# Patient Record
(demographics unavailable — no encounter records)

---

## 2024-11-26 NOTE — HISTORY OF PRESENT ILLNESS
[Has friends] : has friends [Has interests/participates in community activities/volunteers] : has interests/participates in community activities/volunteers. [Is permitted and is able to make independent decisions] : Is permitted and is able to make independent decisions [Grade: ____] : Grade: [unfilled] [Calcium source] : calcium source [Has concerns about body or appearance] : does not have concerns about body or appearance [Uses electronic nicotine delivery system] : does not use electronic nicotine delivery system [Uses tobacco] : does not use tobacco [Uses drugs] : does not use drugs  [Drinks alcohol] : does not drink alcohol [Has peer relationships free of violence] : has peer relationships free of violence [No] : Patient has not had sexual intercourse. [Has ways to cope with stress] : has ways to cope with stress [Displays self-confidence] : displays self-confidence [Has problems with sleep] : does not have problems with sleep [Gets depressed, anxious, or irritable/has mood swings] : does not get depressed, anxious, or irritable/has mood swings [Has thought about hurting self or considered suicide] : has not thought about hurting self or considered suicide [de-identified] : now on gluten free diet as mom read it may help with ADHD, patient feels she's tolerating it well; no restricting, not trying to lose weight [de-identified] : plays soccer at school [FreeTextEntry1] : 18 year old female for annual PE.  Currently in 12th grade. Applying to colleges.   No changes in health in last year. No concerns today.  Follows with child psychiatrist in [place] for ADHD and anxiety. Family has moved to [place]. Patient continues in boarding school. Therapy every two weeks.   LMP: current. Cycles: monthly. Duration: 5-6 days. Periods aren't heavy. Mild dysmenorrhea--relieved with advil or rest.   No chest pain, SOB, dizziness with exertion. No fractures or other injuries. No surgeries. No hx of concussion. Never restricted from sports by MD. No family hx of heart disease or sudden death.  Due for meningitis booster as did not get before 12th grade started.

## 2024-11-26 NOTE — DISCUSSION/SUMMARY
[Physical Growth and Development] : physical growth and development [Social and Academic Competence] : social and academic competence [Emotional Well-Being] : emotional well-being [Risk Reduction] : risk reduction [Violence and Injury Prevention] : violence and injury prevention [FreeTextEntry1] : 18 year old female with ADHD and anxiety for annual PE  1. Meningitis Vaccine administered without issue. VIS provided. Consent obtained. Counseled on common/severe reactions, reasons to see MD. Would also like flu vaccine but does not like needles so will wait for another day for flu vaccine.. 2. AG as per above 3. Continue therapy and psychiatry. 4. HCM labs. 5. Growth curve reviewed with patient and mom. Weight generally closer to 50th percentile but over past two years had fluctuated. Patient denies wanting to lose weight or intentional restricting. Mom feels weight loss may be due to being more active and now following gluten free diet. Discussed weight currently is ok but should work to maintain and not lose further.  6. RTC for physical in 1 year or sooner PRN.

## 2024-11-26 NOTE — HISTORY OF PRESENT ILLNESS
[Has friends] : has friends [Has interests/participates in community activities/volunteers] : has interests/participates in community activities/volunteers. [Is permitted and is able to make independent decisions] : Is permitted and is able to make independent decisions [Grade: ____] : Grade: [unfilled] [Calcium source] : calcium source [Has concerns about body or appearance] : does not have concerns about body or appearance [Uses electronic nicotine delivery system] : does not use electronic nicotine delivery system [Uses tobacco] : does not use tobacco [Uses drugs] : does not use drugs  [Drinks alcohol] : does not drink alcohol [Has peer relationships free of violence] : has peer relationships free of violence [No] : Patient has not had sexual intercourse. [Has ways to cope with stress] : has ways to cope with stress [Displays self-confidence] : displays self-confidence [Has problems with sleep] : does not have problems with sleep [Gets depressed, anxious, or irritable/has mood swings] : does not get depressed, anxious, or irritable/has mood swings [Has thought about hurting self or considered suicide] : has not thought about hurting self or considered suicide [de-identified] : now on gluten free diet as mom read it may help with ADHD, patient feels she's tolerating it well; no restricting, not trying to lose weight [de-identified] : plays soccer at school [FreeTextEntry1] : 18 year old female for annual PE.  Currently in 12th grade. Applying to colleges.   No changes in health in last year. No concerns today.  Follows with child psychiatrist in [place] for ADHD and anxiety. Family has moved to [place]. Patient continues in boarding school. Therapy every two weeks.   LMP: current. Cycles: monthly. Duration: 5-6 days. Periods aren't heavy. Mild dysmenorrhea--relieved with advil or rest.   No chest pain, SOB, dizziness with exertion. No fractures or other injuries. No surgeries. No hx of concussion. Never restricted from sports by MD. No family hx of heart disease or sudden death.  Due for meningitis booster as did not get before 12th grade started.